# Patient Record
Sex: FEMALE | Race: WHITE | URBAN - METROPOLITAN AREA
[De-identification: names, ages, dates, MRNs, and addresses within clinical notes are randomized per-mention and may not be internally consistent; named-entity substitution may affect disease eponyms.]

---

## 2023-10-20 PROBLEM — E66.811 CLASS 1 OBESITY WITH BODY MASS INDEX (BMI) OF 31.0 TO 31.9 IN ADULT: Status: ACTIVE | Noted: 2023-10-20

## 2023-10-20 PROBLEM — E66.9 CLASS 1 OBESITY WITH BODY MASS INDEX (BMI) OF 31.0 TO 31.9 IN ADULT: Status: ACTIVE | Noted: 2023-10-20

## 2023-10-20 PROBLEM — R06.83 SNORING: Status: ACTIVE | Noted: 2023-10-20

## 2023-10-20 PROBLEM — E55.9 VITAMIN D DEFICIENCY: Status: ACTIVE | Noted: 2023-10-20

## 2023-10-20 PROBLEM — M25.512 LEFT SHOULDER PAIN: Status: ACTIVE | Noted: 2023-10-20

## 2023-10-20 PROBLEM — R53.83 FATIGUE: Status: ACTIVE | Noted: 2023-10-20

## 2023-10-22 ASSESSMENT — PROMIS GLOBAL HEALTH SCALE
RATE_PHYSICAL_HEALTH: FAIR
RATE_AVERAGE_FATIGUE: MILD
RATE_QUALITY_OF_LIFE: VERY GOOD
CARRYOUT_SOCIAL_ACTIVITIES: VERY GOOD
RATE_GENERAL_HEALTH: GOOD
RATE_SOCIAL_SATISFACTION: GOOD
CARRYOUT_PHYSICAL_ACTIVITIES: COMPLETELY
RATE_AVERAGE_PAIN: 2
RATE_MENTAL_HEALTH: VERY GOOD
EMOTIONAL_PROBLEMS: SOMETIMES

## 2023-10-23 ENCOUNTER — OFFICE VISIT (OUTPATIENT)
Dept: PRIMARY CARE | Facility: CLINIC | Age: 53
End: 2023-10-23
Payer: COMMERCIAL

## 2023-10-23 ENCOUNTER — ANCILLARY PROCEDURE (OUTPATIENT)
Dept: RADIOLOGY | Facility: CLINIC | Age: 53
End: 2023-10-23
Payer: COMMERCIAL

## 2023-10-23 VITALS
BODY MASS INDEX: 31.24 KG/M2 | TEMPERATURE: 98.2 F | RESPIRATION RATE: 16 BRPM | WEIGHT: 182 LBS | DIASTOLIC BLOOD PRESSURE: 76 MMHG | HEART RATE: 81 BPM | SYSTOLIC BLOOD PRESSURE: 128 MMHG

## 2023-10-23 DIAGNOSIS — E78.2 MIXED HYPERLIPIDEMIA: ICD-10-CM

## 2023-10-23 DIAGNOSIS — E55.9 VITAMIN D DEFICIENCY: ICD-10-CM

## 2023-10-23 DIAGNOSIS — E03.9 HYPOTHYROIDISM, UNSPECIFIED TYPE: ICD-10-CM

## 2023-10-23 DIAGNOSIS — M25.561 ACUTE PAIN OF RIGHT KNEE: ICD-10-CM

## 2023-10-23 DIAGNOSIS — M25.561 ACUTE PAIN OF RIGHT KNEE: Primary | ICD-10-CM

## 2023-10-23 DIAGNOSIS — R53.83 OTHER FATIGUE: ICD-10-CM

## 2023-10-23 PROCEDURE — 73562 X-RAY EXAM OF KNEE 3: CPT | Mod: RIGHT SIDE | Performed by: RADIOLOGY

## 2023-10-23 PROCEDURE — 73562 X-RAY EXAM OF KNEE 3: CPT | Mod: RT,FY

## 2023-10-23 PROCEDURE — 99213 OFFICE O/P EST LOW 20 MIN: CPT | Performed by: NURSE PRACTITIONER

## 2023-10-23 PROCEDURE — 1036F TOBACCO NON-USER: CPT | Performed by: NURSE PRACTITIONER

## 2023-10-23 RX ORDER — METHYLPREDNISOLONE 4 MG/1
TABLET ORAL
Qty: 21 TABLET | Refills: 0 | Status: SHIPPED | OUTPATIENT
Start: 2023-10-23 | End: 2023-10-30

## 2023-10-23 ASSESSMENT — ENCOUNTER SYMPTOMS
SHORTNESS OF BREATH: 0
RHINORRHEA: 0
ADENOPATHY: 0
WHEEZING: 0
PALPITATIONS: 0
DECREASED CONCENTRATION: 0
CHILLS: 0
ARTHRALGIAS: 1
CONSTIPATION: 0
SORE THROAT: 0
HEADACHES: 0
SINUS PRESSURE: 0
COUGH: 0
DIFFICULTY URINATING: 0
BACK PAIN: 0
MYALGIAS: 1
EYE PAIN: 0
JOINT SWELLING: 0
EYE DISCHARGE: 0
VOMITING: 0
NAUSEA: 0
COLOR CHANGE: 0
FEVER: 0
DIARRHEA: 0
FATIGUE: 0
DYSURIA: 0
EYE ITCHING: 0
NERVOUS/ANXIOUS: 0

## 2023-10-23 NOTE — PATIENT INSTRUCTIONS
Patient to start taking medrol as ordered. She will have labs and xrays done, and we will call with results when available. Referred to ortho pending xray results. Follow-up for physical in 3-6 months, or sooner if needed. Call the office if any problems or concerns in the meantime.

## 2023-10-23 NOTE — PROGRESS NOTES
Subjective   Patient ID: john Barragan is a 52 y.o. female who presents for Knee Pain (Pt here to establish care and discuss rt knee pain, some left knee pain, mild swelling).  Knee Pain   The incident occurred more than 1 week ago. The pain is present in the left knee and right knee. The quality of the pain is described as aching. The pain is mild. The pain has been Fluctuating since onset. She has tried acetaminophen and elevation for the symptoms. The treatment provided mild relief.     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 She has more pain towards the end of the day.     She feels swelling or fluid in the joint, and has tightness where she can't completely straighten it. She has tried icing, elevating, and stretching without improvement.     She was on a steroid dose leander and 3 steroid injections over the summer for plantar fascitis in the right foot with Dr. Rinaldi.     Review of Systems   Constitutional:  Negative for chills, fatigue and fever.   HENT:  Negative for congestion, ear pain, rhinorrhea, sinus pressure and sore throat.    Eyes:  Negative for pain, discharge and itching.   Respiratory:  Negative for cough, shortness of breath and wheezing.    Cardiovascular:  Negative for chest pain and palpitations.   Gastrointestinal:  Negative for constipation, diarrhea, nausea and vomiting.   Genitourinary:  Negative for difficulty urinating and dysuria.   Musculoskeletal:  Positive for arthralgias and myalgias. Negative for back pain and joint swelling.   Skin:  Negative for color change.   Neurological:  Negative for headaches.   Hematological:  Negative for adenopathy.   Psychiatric/Behavioral:  Negative for decreased concentration. The patient is not nervous/anxious.        Objective     /76   Pulse 81   Temp 36.8 °C (98.2 °F)   Resp 16   Wt 82.6 kg (182 lb)   BMI 31.24 kg/m²      Physical Exam  Constitutional:       General:  She is not in acute distress.     Appearance: She is not ill-appearing.   HENT:      Head: Normocephalic and atraumatic.      Mouth/Throat:      Mouth: Mucous membranes are moist.      Pharynx: Oropharynx is clear.   Eyes:      Conjunctiva/sclera: Conjunctivae normal.      Pupils: Pupils are equal, round, and reactive to light.   Cardiovascular:      Rate and Rhythm: Normal rate and regular rhythm.      Pulses: Normal pulses.      Heart sounds: Normal heart sounds.   Pulmonary:      Effort: Pulmonary effort is normal. No respiratory distress.      Breath sounds: Normal breath sounds.   Abdominal:      General: Bowel sounds are normal.      Palpations: Abdomen is soft.      Tenderness: There is no abdominal tenderness.   Musculoskeletal:      Right knee: Swelling present. Decreased range of motion. Tenderness present.      Left knee: Normal.        Legs:    Skin:     General: Skin is warm and dry.   Neurological:      Mental Status: She is alert.   Psychiatric:         Mood and Affect: Mood normal.         Behavior: Behavior normal.         Thought Content: Thought content normal.         Judgment: Judgment normal.         Assessment/Plan   Problem List Items Addressed This Visit       Fatigue    Relevant Orders    CBC and Auto Differential    Comprehensive Metabolic Panel    Vitamin D deficiency    Relevant Orders    Vitamin D 25-Hydroxy,Total (for eval of Vitamin D levels)    Vitamin B12    Hypothyroidism    Relevant Orders    TSH with reflex to Free T4 if abnormal     Other Visit Diagnoses       Acute pain of right knee    -  Primary    Relevant Medications    methylPREDNISolone (Medrol Dospak) 4 mg tablets    Other Relevant Orders    XR knee right 4+ views    Referral to Orthopaedic Surgery    Mixed hyperlipidemia        Relevant Orders    Lipid Panel            Patient Instructions   Patient to start taking medrol as ordered. She will have labs and xrays done, and we will call with results when available.  Referred to ortho pending xray results. Follow-up for physical in 3-6 months, or sooner if needed. Call the office if any problems or concerns in the meantime.

## 2023-11-06 DIAGNOSIS — Z12.31 ENCOUNTER FOR SCREENING MAMMOGRAM FOR MALIGNANT NEOPLASM OF BREAST: Primary | ICD-10-CM

## 2023-11-13 ENCOUNTER — OFFICE VISIT (OUTPATIENT)
Dept: ORTHOPEDIC SURGERY | Facility: CLINIC | Age: 53
End: 2023-11-13
Payer: COMMERCIAL

## 2023-11-13 DIAGNOSIS — M25.561 ACUTE PAIN OF RIGHT KNEE: ICD-10-CM

## 2023-11-13 DIAGNOSIS — M76.31 ILIOTIBIAL BAND SYNDROME OF RIGHT SIDE: Primary | ICD-10-CM

## 2023-11-13 PROCEDURE — 1036F TOBACCO NON-USER: CPT | Performed by: FAMILY MEDICINE

## 2023-11-13 PROCEDURE — 99213 OFFICE O/P EST LOW 20 MIN: CPT | Performed by: FAMILY MEDICINE

## 2023-11-13 PROCEDURE — 99203 OFFICE O/P NEW LOW 30 MIN: CPT | Performed by: FAMILY MEDICINE

## 2023-11-14 NOTE — PROGRESS NOTES
Sports Medicine Office Note    Today's Date:  11/13/2023     HPI: Ely Barragan is a 52 y.o. [facility]  who presents today for right knee pain.    She complains of anterior lateral dull intermittent daily pain since September 2023 and denies direct injury or trauma.  This is limited her range of motion and has been exacerbated going up and down stairs.  She recently saw her PCP and had x-rays and was started on a steroid pack.  This is given her temporary relief and she reports not having any pain for the past 5 days.  She denies previous problems to this knee.  She has no problems with the opposite knee.  She has had no other treatments.    She has no other complaints.    Physical Examination:   The right thigh is without obvious signs of acute bony deformity, swelling or instability.  The distal one third IT band is tender.  LFC compression test is positive.  Sheree test is positive.    The RIGHT knee is without obvious signs of acute bony deformity, swelling, erythema, ecchymosis or joint effusion. The patella is without tenderness. Apprehension is negative with medial and lateral glide. Patella crepitus is negative. Patella grind is negative. The medial joint line is nontender and without bony crepitus or step-off. The lateral joint line is nontender and without bony crepitus or step-off. Flexion & extension are full and symmetrical. Varus & valgus stress test at 0° and 30° of flexion, Lachman's, Wally's, Apley's, dial test at 90° and 30° of flexion, and posterior drawer are all negative. The opposite knee is nontender and stable. Gait is pain-free and tandem.    Imaging:  Radiographs of the right knee recently obtained by PCP were reviewed and revealed no obvious signs of acute or chronic bony abnormalities including fractures or dislocations.  There is mild patellofemoral articular narrowing.  The studies were reviewed by me personally in the office today.    === 10/23/23 ===    XR KNEE  3 VIEWS RIGHT  Problem List Items Addressed This Visit             ICD-10-CM    Iliotibial band syndrome of right side - Primary M76.31    Relevant Orders    Referral to Physical Therapy    Acute pain of right knee M25.561    Relevant Orders    Referral to Physical Therapy       Assessment and Plan:     We reviewed the exam and x-ray findings and discussed the conservative and surgical treatment options. We agreed to treat her classic iliotibial band tendinitis conservatively at this time.  She was referred to formal physical therapy.  She can use over-the-counter NSAIDs either oral or topical prescription doses as needed for pain.  She will follow-up in 1 month or sooner for recheck.    **This note was dictated using Dragon speech recognition software and was not corrected for spelling or grammatical errors**.    Bradly Feng MD  Sports Medicine Specialist  [facility] [place]

## 2023-12-13 ENCOUNTER — LAB (OUTPATIENT)
Dept: LAB | Facility: LAB | Age: 53
End: 2023-12-13
Payer: COMMERCIAL

## 2023-12-13 DIAGNOSIS — E78.2 MIXED HYPERLIPIDEMIA: ICD-10-CM

## 2023-12-13 DIAGNOSIS — E55.9 VITAMIN D DEFICIENCY: ICD-10-CM

## 2023-12-13 DIAGNOSIS — E53.8 VITAMIN B12 DEFICIENCY: Primary | ICD-10-CM

## 2023-12-13 DIAGNOSIS — R53.83 OTHER FATIGUE: ICD-10-CM

## 2023-12-13 DIAGNOSIS — E03.9 HYPOTHYROIDISM, UNSPECIFIED TYPE: ICD-10-CM

## 2023-12-13 LAB
25(OH)D3 SERPL-MCNC: 20 NG/ML (ref 30–100)
ALBUMIN SERPL BCP-MCNC: 4.5 G/DL (ref 3.4–5)
ALP SERPL-CCNC: 85 U/L (ref 33–110)
ALT SERPL W P-5'-P-CCNC: 12 U/L (ref 7–45)
ANION GAP SERPL CALC-SCNC: 11 MMOL/L (ref 10–20)
AST SERPL W P-5'-P-CCNC: 15 U/L (ref 9–39)
BASOPHILS # BLD AUTO: 0.04 X10*3/UL (ref 0–0.1)
BASOPHILS NFR BLD AUTO: 0.5 %
BILIRUB SERPL-MCNC: 0.4 MG/DL (ref 0–1.2)
BUN SERPL-MCNC: 14 MG/DL (ref 6–23)
CALCIUM SERPL-MCNC: 9.3 MG/DL (ref 8.6–10.3)
CHLORIDE SERPL-SCNC: 105 MMOL/L (ref 98–107)
CHOLEST SERPL-MCNC: 244 MG/DL (ref 0–199)
CHOLESTEROL/HDL RATIO: 4.9
CO2 SERPL-SCNC: 28 MMOL/L (ref 21–32)
CREAT SERPL-MCNC: 0.82 MG/DL (ref 0.5–1.05)
EOSINOPHIL # BLD AUTO: 0.18 X10*3/UL (ref 0–0.7)
EOSINOPHIL NFR BLD AUTO: 2.1 %
ERYTHROCYTE [DISTWIDTH] IN BLOOD BY AUTOMATED COUNT: 13.2 % (ref 11.5–14.5)
GFR SERPL CREATININE-BSD FRML MDRD: 86 ML/MIN/1.73M*2
GLUCOSE SERPL-MCNC: 100 MG/DL (ref 74–99)
HCT VFR BLD AUTO: 39.1 % (ref 36–46)
HDLC SERPL-MCNC: 49.7 MG/DL
HGB BLD-MCNC: 12.3 G/DL (ref 12–16)
IMM GRANULOCYTES # BLD AUTO: 0.04 X10*3/UL (ref 0–0.7)
IMM GRANULOCYTES NFR BLD AUTO: 0.5 % (ref 0–0.9)
LDLC SERPL CALC-MCNC: 165 MG/DL
LYMPHOCYTES # BLD AUTO: 3.36 X10*3/UL (ref 1.2–4.8)
LYMPHOCYTES NFR BLD AUTO: 39.6 %
MCH RBC QN AUTO: 27.9 PG (ref 26–34)
MCHC RBC AUTO-ENTMCNC: 31.5 G/DL (ref 32–36)
MCV RBC AUTO: 89 FL (ref 80–100)
MONOCYTES # BLD AUTO: 0.82 X10*3/UL (ref 0.1–1)
MONOCYTES NFR BLD AUTO: 9.7 %
NEUTROPHILS # BLD AUTO: 4.05 X10*3/UL (ref 1.2–7.7)
NEUTROPHILS NFR BLD AUTO: 47.6 %
NON HDL CHOLESTEROL: 194 MG/DL (ref 0–149)
NRBC BLD-RTO: 0 /100 WBCS (ref 0–0)
PLATELET # BLD AUTO: 422 X10*3/UL (ref 150–450)
POTASSIUM SERPL-SCNC: 4.4 MMOL/L (ref 3.5–5.3)
PROT SERPL-MCNC: 6.8 G/DL (ref 6.4–8.2)
RBC # BLD AUTO: 4.41 X10*6/UL (ref 4–5.2)
SODIUM SERPL-SCNC: 140 MMOL/L (ref 136–145)
TRIGL SERPL-MCNC: 148 MG/DL (ref 0–149)
TSH SERPL-ACNC: 0.92 MIU/L (ref 0.44–3.98)
VIT B12 SERPL-MCNC: 182 PG/ML (ref 211–911)
VLDL: 30 MG/DL (ref 0–40)
WBC # BLD AUTO: 8.5 X10*3/UL (ref 4.4–11.3)

## 2023-12-13 PROCEDURE — 80053 COMPREHEN METABOLIC PANEL: CPT

## 2023-12-13 PROCEDURE — 36415 COLL VENOUS BLD VENIPUNCTURE: CPT

## 2023-12-13 PROCEDURE — 82607 VITAMIN B-12: CPT

## 2023-12-13 PROCEDURE — 85025 COMPLETE CBC W/AUTO DIFF WBC: CPT

## 2023-12-13 PROCEDURE — 82306 VITAMIN D 25 HYDROXY: CPT

## 2023-12-13 PROCEDURE — 80061 LIPID PANEL: CPT

## 2023-12-13 PROCEDURE — 84443 ASSAY THYROID STIM HORMONE: CPT

## 2023-12-22 ENCOUNTER — ANCILLARY PROCEDURE (OUTPATIENT)
Dept: RADIOLOGY | Facility: CLINIC | Age: 53
End: 2023-12-22
Payer: COMMERCIAL

## 2023-12-22 DIAGNOSIS — Z12.31 ENCOUNTER FOR SCREENING MAMMOGRAM FOR MALIGNANT NEOPLASM OF BREAST: ICD-10-CM

## 2023-12-22 PROCEDURE — 77067 SCR MAMMO BI INCL CAD: CPT | Performed by: RADIOLOGY

## 2023-12-22 PROCEDURE — 77063 BREAST TOMOSYNTHESIS BI: CPT | Performed by: RADIOLOGY

## 2023-12-22 PROCEDURE — 77067 SCR MAMMO BI INCL CAD: CPT

## 2023-12-28 ENCOUNTER — APPOINTMENT (OUTPATIENT)
Dept: ORTHOPEDIC SURGERY | Facility: CLINIC | Age: 53
End: 2023-12-28
Payer: COMMERCIAL

## 2023-12-31 RX ORDER — ERGOCALCIFEROL 1.25 MG/1
50000 CAPSULE ORAL
Qty: 12 CAPSULE | Refills: 0 | Status: SHIPPED | OUTPATIENT
Start: 2023-12-31 | End: 2024-03-24

## 2023-12-31 RX ORDER — LANOLIN ALCOHOL/MO/W.PET/CERES
1000 CREAM (GRAM) TOPICAL DAILY
Qty: 30 TABLET | Refills: 2 | Status: SHIPPED | OUTPATIENT
Start: 2023-12-31 | End: 2024-03-28

## 2024-03-28 DIAGNOSIS — E53.8 VITAMIN B12 DEFICIENCY: ICD-10-CM

## 2024-03-28 RX ORDER — LANOLIN ALCOHOL/MO/W.PET/CERES
1000 CREAM (GRAM) TOPICAL DAILY
Qty: 90 TABLET | Refills: 1 | Status: SHIPPED | OUTPATIENT
Start: 2024-03-28

## 2024-03-30 DIAGNOSIS — E55.9 VITAMIN D DEFICIENCY: ICD-10-CM

## 2024-04-01 RX ORDER — ERGOCALCIFEROL 1.25 MG/1
1 CAPSULE ORAL
Qty: 12 CAPSULE | Refills: 0 | OUTPATIENT
Start: 2024-04-01

## 2024-05-08 ENCOUNTER — OFFICE VISIT (OUTPATIENT)
Dept: PRIMARY CARE | Facility: CLINIC | Age: 54
End: 2024-05-08
Payer: COMMERCIAL

## 2024-05-08 VITALS
OXYGEN SATURATION: 97 % | WEIGHT: 173.8 LBS | BODY MASS INDEX: 29.83 KG/M2 | RESPIRATION RATE: 14 BRPM | HEART RATE: 105 BPM | TEMPERATURE: 98 F

## 2024-05-08 DIAGNOSIS — J02.9 SORE THROAT: ICD-10-CM

## 2024-05-08 DIAGNOSIS — J06.9 ACUTE URI: Primary | ICD-10-CM

## 2024-05-08 LAB — POC RAPID STREP: NEGATIVE

## 2024-05-08 PROCEDURE — 1036F TOBACCO NON-USER: CPT | Performed by: NURSE PRACTITIONER

## 2024-05-08 PROCEDURE — 87651 STREP A DNA AMP PROBE: CPT

## 2024-05-08 PROCEDURE — 87880 STREP A ASSAY W/OPTIC: CPT | Performed by: NURSE PRACTITIONER

## 2024-05-08 PROCEDURE — 99213 OFFICE O/P EST LOW 20 MIN: CPT | Performed by: NURSE PRACTITIONER

## 2024-05-08 RX ORDER — ALBUTEROL SULFATE 90 UG/1
2 AEROSOL, METERED RESPIRATORY (INHALATION) EVERY 6 HOURS PRN
Qty: 18 G | Refills: 0 | Status: SHIPPED | OUTPATIENT
Start: 2024-05-08 | End: 2025-05-08

## 2024-05-08 RX ORDER — FLUTICASONE PROPIONATE 50 MCG
1 SPRAY, SUSPENSION (ML) NASAL DAILY
Qty: 16 G | Refills: 0 | Status: SHIPPED | OUTPATIENT
Start: 2024-05-08 | End: 2025-05-08

## 2024-05-08 RX ORDER — AZITHROMYCIN 250 MG/1
TABLET, FILM COATED ORAL DAILY
Qty: 6 TABLET | Refills: 0 | Status: SHIPPED | OUTPATIENT
Start: 2024-05-08 | End: 2024-05-16 | Stop reason: ALTCHOICE

## 2024-05-08 ASSESSMENT — ENCOUNTER SYMPTOMS
CHILLS: 0
HOARSE VOICE: 1
ACTIVITY CHANGE: 0
VOMITING: 0
FATIGUE: 0
APPETITE CHANGE: 0
SLEEP DISTURBANCE: 1
SORE THROAT: 1
DIARRHEA: 0
NAUSEA: 0
SWOLLEN GLANDS: 1
FEVER: 1
HEADACHES: 1
CONSTIPATION: 0
COUGH: 1

## 2024-05-08 NOTE — ASSESSMENT & PLAN NOTE
IO Strep negative  Strep PCR to be sent  Declines flu/Covid testing  Antibiotics given for acute URI; take full course until completed  Can use Flonase and Zyrtec daily for symptom support  F/U with PCP if not improving over the next 3-5 days  ER for SOB, difficulty breathing, uncontrolled fever

## 2024-05-08 NOTE — PROGRESS NOTES
Subjective   Patient ID: john Barragan is a 53 y.o. female who presents for Sore Throat (Cough for a few months moved to head since Sunday/Covid neg at home on Monday/).    Cold symptoms x couple weeks  Cough x months  Fever at night on Sunday  Body aches  Nasal congestion  Nasal drainage  Home Covid testing is negative  Just keeps getting more symptoms        OTC-zyrtec/dayquil    Sore Throat   This is a new problem. The current episode started in the past 7 days. The problem has been gradually worsening. The pain is worse on the right side. There has been no fever. The pain is at a severity of 2/10. The pain is mild. Associated symptoms include congestion, coughing, ear pain, headaches, a hoarse voice and swollen glands. Pertinent negatives include no diarrhea or vomiting. Treatments tried: zyrtec and dayquil. The treatment provided no relief.        Review of Systems   Constitutional:  Positive for fever. Negative for activity change, appetite change, chills and fatigue.   HENT:  Positive for congestion, ear pain, hoarse voice and sore throat.    Respiratory:  Positive for cough.    Gastrointestinal:  Negative for constipation, diarrhea, nausea and vomiting.   Neurological:  Positive for headaches.   Psychiatric/Behavioral:  Positive for sleep disturbance.        Objective   Pulse 105   Temp 36.7 °C (98 °F)   Wt 78.8 kg (173 lb 12.8 oz)   SpO2 97%   BMI 29.83 kg/m²     Physical Exam  Vitals reviewed.   Constitutional:       Appearance: Normal appearance.   HENT:      Head: Normocephalic.      Right Ear: Tympanic membrane, ear canal and external ear normal.      Left Ear: Tympanic membrane, ear canal and external ear normal.      Nose: Mucosal edema, congestion and rhinorrhea present.      Right Turbinates: Swollen.      Left Turbinates: Swollen.      Mouth/Throat:      Lips: Pink.      Mouth: Mucous membranes are moist.      Pharynx: Posterior oropharyngeal erythema present.   Eyes:      Extraocular  Movements: Extraocular movements intact.      Conjunctiva/sclera: Conjunctivae normal.      Pupils: Pupils are equal, round, and reactive to light.   Cardiovascular:      Rate and Rhythm: Regular rhythm. Tachycardia present.      Pulses: Normal pulses.      Heart sounds: Normal heart sounds.   Pulmonary:      Effort: Pulmonary effort is normal.      Breath sounds: Normal breath sounds.   Abdominal:      General: Abdomen is flat.      Palpations: Abdomen is soft.   Musculoskeletal:      Cervical back: Normal range of motion and neck supple.   Skin:     General: Skin is warm.      Capillary Refill: Capillary refill takes less than 2 seconds.   Neurological:      General: No focal deficit present.      Mental Status: She is alert and oriented to person, place, and time.   Psychiatric:         Mood and Affect: Mood normal.         Behavior: Behavior normal.         Assessment/Plan   Problem List Items Addressed This Visit             ICD-10-CM    Acute URI - Primary J06.9    Relevant Medications    azithromycin (Zithromax) 250 mg tablet    fluticasone (Flonase) 50 mcg/actuation nasal spray    albuterol 90 mcg/actuation inhaler    Sore throat J02.9     IO Strep negative  Strep PCR to be sent  Declines flu/Covid testing  Antibiotics given for acute URI; take full course until completed  Can use Flonase and Zyrtec daily for symptom support  F/U with PCP if not improving over the next 3-5 days  ER for SOB, difficulty breathing, uncontrolled fever         Relevant Orders    POCT rapid strep A manually resulted (Completed)    Group A Streptococcus, PCR

## 2024-05-09 LAB — S PYO DNA THROAT QL NAA+PROBE: NOT DETECTED

## 2024-05-16 ENCOUNTER — OFFICE VISIT (OUTPATIENT)
Dept: PRIMARY CARE | Facility: CLINIC | Age: 54
End: 2024-05-16
Payer: COMMERCIAL

## 2024-05-16 VITALS
RESPIRATION RATE: 20 BRPM | DIASTOLIC BLOOD PRESSURE: 76 MMHG | SYSTOLIC BLOOD PRESSURE: 126 MMHG | WEIGHT: 173.4 LBS | BODY MASS INDEX: 29.76 KG/M2 | OXYGEN SATURATION: 97 % | TEMPERATURE: 98.1 F | HEART RATE: 94 BPM

## 2024-05-16 DIAGNOSIS — R05.1 ACUTE COUGH: Primary | ICD-10-CM

## 2024-05-16 DIAGNOSIS — J06.9 ACUTE URI: ICD-10-CM

## 2024-05-16 PROCEDURE — 1036F TOBACCO NON-USER: CPT | Performed by: NURSE PRACTITIONER

## 2024-05-16 PROCEDURE — 99213 OFFICE O/P EST LOW 20 MIN: CPT | Performed by: NURSE PRACTITIONER

## 2024-05-16 RX ORDER — METHYLPREDNISOLONE 4 MG/1
TABLET ORAL
Qty: 21 TABLET | Refills: 0 | Status: SHIPPED | OUTPATIENT
Start: 2024-05-16 | End: 2024-05-23

## 2024-05-16 RX ORDER — BENZONATATE 100 MG/1
100 CAPSULE ORAL 3 TIMES DAILY PRN
Qty: 30 CAPSULE | Refills: 0 | Status: SHIPPED | OUTPATIENT
Start: 2024-05-16 | End: 2024-05-26

## 2024-05-16 RX ORDER — DOXYCYCLINE 100 MG/1
100 CAPSULE ORAL 2 TIMES DAILY
Qty: 14 CAPSULE | Refills: 0 | Status: SHIPPED | OUTPATIENT
Start: 2024-05-16 | End: 2024-05-23

## 2024-05-16 ASSESSMENT — ENCOUNTER SYMPTOMS
APPETITE CHANGE: 0
NAUSEA: 0
FEVER: 0
COUGH: 1
ACTIVITY CHANGE: 0
CHILLS: 0
HEADACHES: 1
SORE THROAT: 0
VOMITING: 0
SINUS PAIN: 0
CONSTIPATION: 0
SLEEP DISTURBANCE: 1
SINUS PRESSURE: 0
DIARRHEA: 0

## 2024-05-16 NOTE — ASSESSMENT & PLAN NOTE
Declines testing due to cost  Discussed viral vs bacterial infections  Encouraged daily use of Flonase and Zyrtec for symptom support  Steroid taper and tessalon to help with cough  Increase hydration  If not improving, with supportive care, antibiotic switched to Doxycycline  Follow up with PCP or in clinic if not improving after 3-4 days on Rx  ER for any SOB, difficulty breathing, uncontrolled fevers or worsening of symptoms

## 2024-05-16 NOTE — PROGRESS NOTES
Subjective   Patient ID: john Barragan is a 53 y.o. female who presents for Cough (congestion).    Cold symptoms x months  Seen last week; took zpack, but symptoms never improved  Cough  Nasal congestion  Ear fullness  headaches  No sore throat  No fever or chills  No GI issues    Declines any testing due to cough    OTC- zyrtec    Cough  This is a new problem. The current episode started 1 to 4 weeks ago. The problem has been gradually worsening. The problem occurs constantly. The cough is Non-productive. Associated symptoms include headaches and nasal congestion. Pertinent negatives include no chills, ear congestion, ear pain, fever or sore throat. Exacerbated by: talking. Treatments tried: zyrtec sudafed tussin cf. The treatment provided no relief.        Review of Systems   Constitutional:  Negative for activity change, appetite change, chills and fever.   HENT:  Positive for congestion. Negative for ear pain, sinus pressure, sinus pain and sore throat.    Respiratory:  Positive for cough.    Gastrointestinal:  Negative for constipation, diarrhea, nausea and vomiting.   Neurological:  Positive for headaches.   Psychiatric/Behavioral:  Positive for sleep disturbance.        Objective   /76   Pulse 94   Temp 36.7 °C (98.1 °F)   Resp 20   Wt 78.7 kg (173 lb 6.4 oz)   SpO2 97%   BMI 29.76 kg/m²     Physical Exam  Vitals reviewed.   Constitutional:       Appearance: Normal appearance.   HENT:      Head: Normocephalic.      Right Ear: Tympanic membrane, ear canal and external ear normal.      Left Ear: Tympanic membrane, ear canal and external ear normal.      Nose: Mucosal edema, congestion and rhinorrhea present. Rhinorrhea is clear.      Right Turbinates: Swollen.      Left Turbinates: Swollen.      Mouth/Throat:      Lips: Pink.      Mouth: Mucous membranes are moist.      Pharynx: Posterior oropharyngeal erythema present.   Eyes:      Extraocular Movements: Extraocular movements intact.       Conjunctiva/sclera: Conjunctivae normal.      Pupils: Pupils are equal, round, and reactive to light.   Cardiovascular:      Rate and Rhythm: Normal rate and regular rhythm.      Pulses: Normal pulses.      Heart sounds: Normal heart sounds.   Pulmonary:      Effort: Pulmonary effort is normal.      Breath sounds: Normal breath sounds.   Musculoskeletal:      Cervical back: Normal range of motion and neck supple.   Skin:     General: Skin is warm and dry.      Capillary Refill: Capillary refill takes less than 2 seconds.   Neurological:      General: No focal deficit present.      Mental Status: She is alert and oriented to person, place, and time.   Psychiatric:         Mood and Affect: Mood normal.         Behavior: Behavior normal.         Assessment/Plan   Problem List Items Addressed This Visit             ICD-10-CM    Acute cough - Primary R05.1     Declines testing due to cost  Discussed viral vs bacterial infections  Encouraged daily use of Flonase and Zyrtec for symptom support  Steroid taper and tessalon to help with cough  Increase hydration  If not improving, with supportive care, antibiotic switched to Doxycycline  Follow up with PCP or in clinic if not improving after 3-4 days on Rx  ER for any SOB, difficulty breathing, uncontrolled fevers or worsening of symptoms           Relevant Medications    methylPREDNISolone (Medrol Dospak) 4 mg tablets    benzonatate (Tessalon) 100 mg capsule

## 2024-10-09 ENCOUNTER — TELEPHONE (OUTPATIENT)
Dept: PRIMARY CARE | Facility: CLINIC | Age: 54
End: 2024-10-09
Payer: COMMERCIAL

## 2024-10-09 NOTE — TELEPHONE ENCOUNTER
Patient has an appointment with Keturah on 11/20/24 she is requesting to do lab orders prior to appointment. So she may go over her labs with Keturah at appt. Can labs orders be placed please.    Thank you !

## 2024-10-14 DIAGNOSIS — Z00.00 HEALTHCARE MAINTENANCE: Primary | ICD-10-CM

## 2024-11-20 ENCOUNTER — APPOINTMENT (OUTPATIENT)
Dept: PRIMARY CARE | Facility: CLINIC | Age: 54
End: 2024-11-20
Payer: COMMERCIAL

## 2024-11-20 VITALS
DIASTOLIC BLOOD PRESSURE: 68 MMHG | TEMPERATURE: 97.2 F | HEIGHT: 64 IN | RESPIRATION RATE: 16 BRPM | SYSTOLIC BLOOD PRESSURE: 110 MMHG | HEART RATE: 80 BPM | WEIGHT: 167 LBS | OXYGEN SATURATION: 97 % | BODY MASS INDEX: 28.51 KG/M2

## 2024-11-20 DIAGNOSIS — Z13.9 SCREENING DUE: ICD-10-CM

## 2024-11-20 DIAGNOSIS — N95.1 VASOMOTOR SYMPTOMS DUE TO MENOPAUSE: ICD-10-CM

## 2024-11-20 DIAGNOSIS — R06.83 SNORING: ICD-10-CM

## 2024-11-20 DIAGNOSIS — Z12.4 SCREENING FOR CERVICAL CANCER: ICD-10-CM

## 2024-11-20 DIAGNOSIS — Z12.11 SCREENING FOR COLON CANCER: ICD-10-CM

## 2024-11-20 DIAGNOSIS — E55.9 VITAMIN D DEFICIENCY: ICD-10-CM

## 2024-11-20 DIAGNOSIS — Z00.00 ROUTINE GENERAL MEDICAL EXAMINATION AT HEALTH CARE FACILITY: Primary | ICD-10-CM

## 2024-11-20 PROBLEM — J06.9 ACUTE URI: Status: RESOLVED | Noted: 2024-05-08 | Resolved: 2024-11-20

## 2024-11-20 PROBLEM — J02.9 SORE THROAT: Status: RESOLVED | Noted: 2024-05-08 | Resolved: 2024-11-20

## 2024-11-20 PROBLEM — R53.83 FATIGUE: Status: RESOLVED | Noted: 2023-10-20 | Resolved: 2024-11-20

## 2024-11-20 PROBLEM — M25.512 LEFT SHOULDER PAIN: Status: RESOLVED | Noted: 2023-10-20 | Resolved: 2024-11-20

## 2024-11-20 PROBLEM — R05.1 ACUTE COUGH: Status: RESOLVED | Noted: 2024-05-16 | Resolved: 2024-11-20

## 2024-11-20 PROBLEM — E66.811 CLASS 1 OBESITY WITH BODY MASS INDEX (BMI) OF 31.0 TO 31.9 IN ADULT: Status: RESOLVED | Noted: 2023-10-20 | Resolved: 2024-11-20

## 2024-11-20 PROCEDURE — 87624 HPV HI-RISK TYP POOLED RSLT: CPT

## 2024-11-20 PROCEDURE — 1036F TOBACCO NON-USER: CPT | Performed by: NURSE PRACTITIONER

## 2024-11-20 PROCEDURE — 90471 IMMUNIZATION ADMIN: CPT | Performed by: NURSE PRACTITIONER

## 2024-11-20 PROCEDURE — 3008F BODY MASS INDEX DOCD: CPT | Performed by: NURSE PRACTITIONER

## 2024-11-20 PROCEDURE — 99396 PREV VISIT EST AGE 40-64: CPT | Performed by: NURSE PRACTITIONER

## 2024-11-20 PROCEDURE — 90715 TDAP VACCINE 7 YRS/> IM: CPT | Performed by: NURSE PRACTITIONER

## 2024-11-20 ASSESSMENT — PROMIS GLOBAL HEALTH SCALE
EMOTIONAL_PROBLEMS: SOMETIMES
RATE_MENTAL_HEALTH: GOOD
RATE_QUALITY_OF_LIFE: GOOD
RATE_AVERAGE_PAIN: 2
RATE_AVERAGE_FATIGUE: MILD
CARRYOUT_PHYSICAL_ACTIVITIES: MOSTLY
RATE_GENERAL_HEALTH: GOOD
CARRYOUT_SOCIAL_ACTIVITIES: VERY GOOD
RATE_PHYSICAL_HEALTH: FAIR
RATE_SOCIAL_SATISFACTION: FAIR

## 2024-11-20 ASSESSMENT — ENCOUNTER SYMPTOMS
NEUROLOGICAL NEGATIVE: 1
FATIGUE: 1
ABDOMINAL PAIN: 1
NERVOUS/ANXIOUS: 1
COLOR CHANGE: 1
CARDIOVASCULAR NEGATIVE: 1
ENDOCRINE NEGATIVE: 1
PALPITATIONS: 0
ROS GI COMMENTS: SOMETIMES
ALLERGIC/IMMUNOLOGIC NEGATIVE: 1
ARTHRALGIAS: 1
RESPIRATORY NEGATIVE: 1
SLEEP DISTURBANCE: 1

## 2024-11-20 ASSESSMENT — PATIENT HEALTH QUESTIONNAIRE - PHQ9
1. LITTLE INTEREST OR PLEASURE IN DOING THINGS: SEVERAL DAYS
2. FEELING DOWN, DEPRESSED OR HOPELESS: SEVERAL DAYS
10. IF YOU CHECKED OFF ANY PROBLEMS, HOW DIFFICULT HAVE THESE PROBLEMS MADE IT FOR YOU TO DO YOUR WORK, TAKE CARE OF THINGS AT HOME, OR GET ALONG WITH OTHER PEOPLE: NOT DIFFICULT AT ALL
SUM OF ALL RESPONSES TO PHQ9 QUESTIONS 1 AND 2: 2

## 2024-11-20 NOTE — PROGRESS NOTES
"Subjective   Ely Barragan is a 53 y.o. female who presents for Annual Exam (CPE).  CPE  with PAP  Colonoscopy- never done  Mammogram had 11.22.2023, has the order  since 11.17.24       Review of Systems   Constitutional:  Positive for fatigue.   Respiratory: Negative.     Cardiovascular: Negative.  Negative for chest pain, palpitations and leg swelling.   Gastrointestinal:  Positive for abdominal pain.        Sometimes   Endocrine: Negative.    Musculoskeletal:  Positive for arthralgias.        Bilateral knee more L  and hips   Skin:  Positive for color change.        Spots on her legs  and R arm   Allergic/Immunologic: Negative.    Neurological: Negative.    Psychiatric/Behavioral:  Positive for sleep disturbance. The patient is nervous/anxious.         Sometimes   All other systems reviewed and are negative.      Objective   Physical Exam  Vitals reviewed.   HENT:      Head: Normocephalic.   Cardiovascular:      Rate and Rhythm: Normal rate and regular rhythm.      Pulses: Normal pulses.      Heart sounds: Normal heart sounds. No murmur heard.     No friction rub. No gallop.   Pulmonary:      Effort: Pulmonary effort is normal. No respiratory distress.      Breath sounds: Normal breath sounds. No stridor. No wheezing, rhonchi or rales.   Chest:      Chest wall: No tenderness.   Abdominal:          Comments: Intermittent pain with deep palpation     Skin:     Comments: Random stuck on hyperpigmented skin lesion consitent with ak     Neurological:      General: No focal deficit present.      Mental Status: She is alert and oriented to person, place, and time. Mental status is at baseline.       /68 (BP Location: Left arm, Patient Position: Sitting)   Pulse 80   Temp 36.2 °C (97.2 °F)   Resp 16   Ht 1.626 m (5' 4\")   Wt 75.8 kg (167 lb)   LMP 10/19/2024   SpO2 97%   BMI 28.67 kg/m²       Assessment/Plan   Problem List Items Addressed This Visit       Snoring    Vitamin D deficiency    Vasomotor " symptoms due to menopause     14 day sample of [facility] provider- non hormonal management of hot flashes works by blocking the neuronal activity in the thermoregulatory center in the brain reducing hot flashes. If this works well could consider continuing for management.     Could also try pulse therapy for premenstrual dysphoric disorder. This would be taking fluoxetine 20 mg daily during luteal phase to help control symptoms of mood swings associated with hormone changes.     SNRI like desvenlafaxine or venlafaxine- take 25-50 mg daily this works by inhibiting norepinephrine, serotonin and dopamine- it works for menopasue by reducing hot flashes and night sweats by about 1-2 episodes per day (takes 4 weeks to work effectively)           Other Visit Diagnoses       Routine general medical examination at health care facility    -  Primary    Relevant Orders    Referral to Dermatology    Screening for colon cancer        Relevant Orders    Cologuard® colon cancer screening    Screening for cervical cancer        Relevant Orders    THINPREP PAP TEST    Screening due        Relevant Orders    Varicella Zoster Antibody, IgG            Counseled on shingrix vaccine- at this time does not want to but will check varicella titers to make sure she had chicken pox reports only two lesions as a child.

## 2024-11-20 NOTE — ASSESSMENT & PLAN NOTE
14 day sample of [facility] provider- non hormonal management of hot flashes works by blocking the neuronal activity in the thermoregulatory center in the brain reducing hot flashes. If this works well could consider continuing for management.     Could also try pulse therapy for premenstrual dysphoric disorder. This would be taking fluoxetine 20 mg daily during luteal phase to help control symptoms of mood swings associated with hormone changes.     SNRI like desvenlafaxine or venlafaxine- take 25-50 mg daily this works by inhibiting norepinephrine, serotonin and dopamine- it works for menopasue by reducing hot flashes and night sweats by about 1-2 episodes per day (takes 4 weeks to work effectively)

## 2024-12-24 ENCOUNTER — APPOINTMENT (OUTPATIENT)
Dept: PRIMARY CARE | Facility: CLINIC | Age: 54
End: 2024-12-24
Payer: COMMERCIAL

## 2024-12-27 ENCOUNTER — HOSPITAL ENCOUNTER (OUTPATIENT)
Dept: RADIOLOGY | Facility: CLINIC | Age: 54
Discharge: HOME | End: 2024-12-27
Payer: COMMERCIAL

## 2024-12-27 VITALS — WEIGHT: 167.11 LBS | BODY MASS INDEX: 28.53 KG/M2 | HEIGHT: 64 IN

## 2024-12-27 DIAGNOSIS — N64.59 ABNORMAL BREAST EXAM: Primary | ICD-10-CM

## 2024-12-27 DIAGNOSIS — Z12.31 SCREENING MAMMOGRAM FOR BREAST CANCER: ICD-10-CM

## 2024-12-27 PROCEDURE — 77067 SCR MAMMO BI INCL CAD: CPT

## 2024-12-31 ENCOUNTER — LAB (OUTPATIENT)
Dept: LAB | Facility: LAB | Age: 54
End: 2024-12-31
Payer: COMMERCIAL

## 2024-12-31 DIAGNOSIS — Z13.9 SCREENING DUE: ICD-10-CM

## 2024-12-31 DIAGNOSIS — Z00.00 HEALTHCARE MAINTENANCE: ICD-10-CM

## 2024-12-31 LAB
25(OH)D3 SERPL-MCNC: 22 NG/ML (ref 30–100)
ALBUMIN SERPL BCP-MCNC: 4.3 G/DL (ref 3.4–5)
ALP SERPL-CCNC: 93 U/L (ref 33–110)
ALT SERPL W P-5'-P-CCNC: 22 U/L (ref 7–45)
ANION GAP SERPL CALC-SCNC: 14 MMOL/L (ref 10–20)
AST SERPL W P-5'-P-CCNC: 24 U/L (ref 9–39)
BILIRUB SERPL-MCNC: 0.3 MG/DL (ref 0–1.2)
BUN SERPL-MCNC: 14 MG/DL (ref 6–23)
CALCIUM SERPL-MCNC: 9.3 MG/DL (ref 8.6–10.6)
CHLORIDE SERPL-SCNC: 105 MMOL/L (ref 98–107)
CHOLEST SERPL-MCNC: 253 MG/DL (ref 0–199)
CHOLESTEROL/HDL RATIO: 4.5
CO2 SERPL-SCNC: 28 MMOL/L (ref 21–32)
CREAT SERPL-MCNC: 0.79 MG/DL (ref 0.5–1.05)
EGFRCR SERPLBLD CKD-EPI 2021: 89 ML/MIN/1.73M*2
ERYTHROCYTE [DISTWIDTH] IN BLOOD BY AUTOMATED COUNT: 12.9 % (ref 11.5–14.5)
EST. AVERAGE GLUCOSE BLD GHB EST-MCNC: 120 MG/DL
GLUCOSE SERPL-MCNC: 94 MG/DL (ref 74–99)
HBA1C MFR BLD: 5.8 %
HCT VFR BLD AUTO: 40.9 % (ref 36–46)
HDLC SERPL-MCNC: 56.7 MG/DL
HGB BLD-MCNC: 12.6 G/DL (ref 12–16)
LDLC SERPL CALC-MCNC: 163 MG/DL
MCH RBC QN AUTO: 27.6 PG (ref 26–34)
MCHC RBC AUTO-ENTMCNC: 30.8 G/DL (ref 32–36)
MCV RBC AUTO: 90 FL (ref 80–100)
NON HDL CHOLESTEROL: 196 MG/DL (ref 0–149)
NRBC BLD-RTO: 0 /100 WBCS (ref 0–0)
PLATELET # BLD AUTO: 417 X10*3/UL (ref 150–450)
POTASSIUM SERPL-SCNC: 4.6 MMOL/L (ref 3.5–5.3)
PROT SERPL-MCNC: 6.9 G/DL (ref 6.4–8.2)
RBC # BLD AUTO: 4.56 X10*6/UL (ref 4–5.2)
SODIUM SERPL-SCNC: 142 MMOL/L (ref 136–145)
TRIGL SERPL-MCNC: 165 MG/DL (ref 0–149)
TSH SERPL-ACNC: 1.04 MIU/L (ref 0.44–3.98)
VARICELLA ZOSTER IGG INDEX: 0.8 IA
VIT B12 SERPL-MCNC: 428 PG/ML (ref 211–911)
VLDL: 33 MG/DL (ref 0–40)
VZV IGG SER QL IA: NEGATIVE
WBC # BLD AUTO: 6.7 X10*3/UL (ref 4.4–11.3)

## 2024-12-31 PROCEDURE — 85027 COMPLETE CBC AUTOMATED: CPT

## 2024-12-31 PROCEDURE — 84443 ASSAY THYROID STIM HORMONE: CPT

## 2024-12-31 PROCEDURE — 86787 VARICELLA-ZOSTER ANTIBODY: CPT

## 2024-12-31 PROCEDURE — 80053 COMPREHEN METABOLIC PANEL: CPT

## 2024-12-31 PROCEDURE — 82306 VITAMIN D 25 HYDROXY: CPT

## 2024-12-31 PROCEDURE — 80061 LIPID PANEL: CPT

## 2024-12-31 PROCEDURE — 83036 HEMOGLOBIN GLYCOSYLATED A1C: CPT

## 2024-12-31 PROCEDURE — 82607 VITAMIN B-12: CPT

## 2025-01-02 DIAGNOSIS — E78.2 MIXED HYPERLIPIDEMIA: Primary | ICD-10-CM

## 2025-01-02 RX ORDER — ROSUVASTATIN CALCIUM 10 MG/1
10 TABLET, COATED ORAL DAILY
Qty: 100 TABLET | Refills: 3 | Status: SHIPPED | OUTPATIENT
Start: 2025-01-02 | End: 2026-02-06

## 2025-05-09 ENCOUNTER — OFFICE VISIT (OUTPATIENT)
Dept: PRIMARY CARE | Facility: CLINIC | Age: 55
End: 2025-05-09
Payer: COMMERCIAL

## 2025-05-09 VITALS
TEMPERATURE: 98 F | OXYGEN SATURATION: 97 % | WEIGHT: 173 LBS | RESPIRATION RATE: 18 BRPM | DIASTOLIC BLOOD PRESSURE: 86 MMHG | BODY MASS INDEX: 29.68 KG/M2 | HEART RATE: 80 BPM | SYSTOLIC BLOOD PRESSURE: 128 MMHG

## 2025-05-09 DIAGNOSIS — J40 SINOBRONCHITIS: Primary | ICD-10-CM

## 2025-05-09 DIAGNOSIS — J32.9 SINOBRONCHITIS: Primary | ICD-10-CM

## 2025-05-09 PROCEDURE — 99213 OFFICE O/P EST LOW 20 MIN: CPT | Performed by: NURSE PRACTITIONER

## 2025-05-09 PROCEDURE — 1036F TOBACCO NON-USER: CPT | Performed by: NURSE PRACTITIONER

## 2025-05-09 RX ORDER — FLUTICASONE PROPIONATE 50 MCG
1 SPRAY, SUSPENSION (ML) NASAL DAILY
Qty: 16 G | Refills: 0 | Status: SHIPPED | OUTPATIENT
Start: 2025-05-09 | End: 2025-06-08

## 2025-05-09 RX ORDER — ALBUTEROL SULFATE 90 UG/1
2 INHALANT RESPIRATORY (INHALATION) EVERY 4 HOURS PRN
Qty: 8.5 G | Refills: 0 | Status: SHIPPED | OUTPATIENT
Start: 2025-05-09 | End: 2025-06-08

## 2025-05-09 RX ORDER — ALBUTEROL SULFATE 0.83 MG/ML
2.5 SOLUTION RESPIRATORY (INHALATION) 4 TIMES DAILY PRN
Qty: 120 ML | Refills: 0 | Status: SHIPPED | OUTPATIENT
Start: 2025-05-09 | End: 2025-06-08

## 2025-05-09 RX ORDER — DOXYCYCLINE 100 MG/1
100 CAPSULE ORAL 2 TIMES DAILY
Qty: 20 CAPSULE | Refills: 0 | Status: SHIPPED | OUTPATIENT
Start: 2025-05-09 | End: 2025-05-19

## 2025-05-09 RX ORDER — METHYLPREDNISOLONE 4 MG/1
TABLET ORAL
Qty: 21 TABLET | Refills: 0 | Status: SHIPPED | OUTPATIENT
Start: 2025-05-09 | End: 2025-05-15

## 2025-05-09 ASSESSMENT — ENCOUNTER SYMPTOMS
HEADACHES: 1
COUGH: 1
MYALGIAS: 0
CHEST TIGHTNESS: 0
ABDOMINAL PAIN: 0
SORE THROAT: 0
CHILLS: 0
RHINORRHEA: 1
WHEEZING: 1
FATIGUE: 1
VOMITING: 0
SHORTNESS OF BREATH: 1
NAUSEA: 0
APPETITE CHANGE: 0
DIARRHEA: 0
SINUS PRESSURE: 1

## 2025-05-09 NOTE — PROGRESS NOTES
Subjective   Patient ID: john Barragan is a 54 y.o. female who presents for Cough.    Symptoms started two months ago with a cough/chest congestion. She was blowing her nose a lot. She had a fever when it started. Her symptoms have continued and persisted. Patient says that she has a lot of nasal congestion. The mucous coming out of her chest and nose is yellow/green. Pt has coughing fits. Pt has been using an inhaler that has been helping. No chest pain.          Review of Systems   Constitutional:  Positive for fatigue. Negative for appetite change and chills.   HENT:  Positive for congestion, postnasal drip, rhinorrhea and sinus pressure. Negative for sore throat.    Respiratory:  Positive for cough, shortness of breath and wheezing. Negative for chest tightness.    Cardiovascular:  Negative for chest pain.   Gastrointestinal:  Negative for abdominal pain, diarrhea, nausea and vomiting.   Musculoskeletal:  Negative for myalgias.   Neurological:  Positive for headaches.       Objective   /86   Pulse 80   Temp 36.7 °C (98 °F)   Resp 18   Wt 78.5 kg (173 lb)   SpO2 97%   BMI 29.68 kg/m²     Physical Exam  Vitals reviewed.   Constitutional:       General: She is not in acute distress.     Appearance: Normal appearance. She is not toxic-appearing.   HENT:      Head: Atraumatic.      Right Ear: Tympanic membrane, ear canal and external ear normal.      Left Ear: Tympanic membrane, ear canal and external ear normal.      Nose: Congestion and rhinorrhea present.      Right Sinus: Maxillary sinus tenderness present. No frontal sinus tenderness.      Left Sinus: Maxillary sinus tenderness present. No frontal sinus tenderness.      Mouth/Throat:      Mouth: Mucous membranes are moist.      Pharynx: Oropharynx is clear. No oropharyngeal exudate or posterior oropharyngeal erythema.      Comments: Right tonsil enlarged +2, left tonsil is normal in size   Cardiovascular:      Rate and Rhythm: Normal rate and regular  rhythm.      Heart sounds: Normal heart sounds. No murmur heard.  Pulmonary:      Effort: Pulmonary effort is normal.      Breath sounds: Normal breath sounds. No wheezing or rhonchi.   Musculoskeletal:         General: Normal range of motion.   Skin:     General: Skin is warm.   Neurological:      General: No focal deficit present.      Mental Status: She is alert.     Assessment/Plan   Problem List Items Addressed This Visit    None  Visit Diagnoses         Codes      Sinobronchitis    -  Primary J32.9, J40    Relevant Medications    doxycycline (Vibramycin) 100 mg capsule    fluticasone (Flonase) 50 mcg/actuation nasal spray    albuterol 2.5 mg /3 mL (0.083 %) nebulizer solution    albuterol (ProAir HFA) 90 mcg/actuation inhaler        Patient has been sick for the past 2 months. Will treat this as a bacterial infection with antibiotics. Will start pt on doxycycline, flonase and proair inhaler. Pt has a nebulizer machine at home. Pt to do breathing treatment every four to six hours as needed. Pt aware to use inhaler OR breathing treatment every four to six hours. Will also start pt on medrol leander at this time. Patient reminded to avoid other anti-inflammatories while on the steroids; she agreed. Pt may take tylenol as needed. Advised patient on use of humidifier and hot steam treatments. Discussed that patient is to drink plenty of fluids and stay well hydrated. Discussed that patient is to go to the ER for any chest pain, difficulty breathing, shortness of breath or new/concerning symptoms; she agreed. Pt declined the need for a chest xray at this time.

## 2025-05-22 LAB — NONINV COLON CA DNA+OCC BLD SCRN STL QL: NEGATIVE
